# Patient Record
Sex: MALE | Race: WHITE | Employment: FULL TIME | URBAN - METROPOLITAN AREA
[De-identification: names, ages, dates, MRNs, and addresses within clinical notes are randomized per-mention and may not be internally consistent; named-entity substitution may affect disease eponyms.]

---

## 2018-02-05 ENCOUNTER — OFFICE VISIT (OUTPATIENT)
Dept: SLEEP MEDICINE | Age: 33
End: 2018-02-05

## 2018-02-05 ENCOUNTER — HOSPITAL ENCOUNTER (OUTPATIENT)
Dept: OTHER | Age: 33
Discharge: HOME | End: 2018-02-07
Attending: PSYCHIATRY & NEUROLOGY | Admitting: PSYCHIATRY & NEUROLOGY

## 2018-02-05 VITALS
BODY MASS INDEX: 39.11 KG/M2 | TEMPERATURE: 98.4 F | RESPIRATION RATE: 16 BRPM | SYSTOLIC BLOOD PRESSURE: 120 MMHG | HEART RATE: 94 BPM | DIASTOLIC BLOOD PRESSURE: 84 MMHG | WEIGHT: 249.2 LBS | HEIGHT: 67 IN | OXYGEN SATURATION: 94 %

## 2018-02-05 DIAGNOSIS — F51.8 HYPNIC JERKS: ICD-10-CM

## 2018-02-05 DIAGNOSIS — G47.33 OSA (OBSTRUCTIVE SLEEP APNEA): ICD-10-CM

## 2018-02-05 DIAGNOSIS — G47.33 OSA (OBSTRUCTIVE SLEEP APNEA): Primary | ICD-10-CM

## 2018-02-05 PROCEDURE — 99204 OFFICE O/P NEW MOD 45 MIN: CPT | Performed by: PSYCHIATRY & NEUROLOGY

## 2018-02-05 ASSESSMENT — SLEEP AND FATIGUE QUESTIONNAIRES
HOW LIKELY ARE YOU TO NOD OFF OR FALL ASLEEP WHILE SITTING AND READING: 3
HOW LIKELY ARE YOU TO NOD OFF OR FALL ASLEEP WHILE SITTING INACTIVE IN A PUBLIC PLACE: 2
HOW LIKELY ARE YOU TO NOD OFF OR FALL ASLEEP IN A CAR, WHILE STOPPED FOR A FEW MINUTES IN TRAFFIC: 2
HOW LIKELY ARE YOU TO NOD OFF OR FALL ASLEEP WHILE SITTING QUIETLY AFTER LUNCH WITHOUT ALCOHOL: 2
HOW LIKELY ARE YOU TO NOD OFF OR FALL ASLEEP WHILE LYING DOWN TO REST IN THE AFTERNOON WHEN CIRCUMSTANCES PERMIT: 3
HOW LIKELY ARE YOU TO NOD OFF OR FALL ASLEEP WHEN YOU ARE A PASSENGER IN A CAR FOR AN HOUR WITHOUT A BREAK: 2
HOW LIKELY ARE YOU TO NOD OFF OR FALL ASLEEP WHILE WATCHING TV: 2
HOW LIKELY ARE YOU TO NOD OFF OR FALL ASLEEP WHILE SITTING AND TALKING TO SOMEONE: 0
NECK CIRCUMFERENCE (INCHES): 19.25
ESS TOTAL SCORE: 16

## 2018-02-05 ASSESSMENT — ENCOUNTER SYMPTOMS
EYES NEGATIVE: 1
CHOKING: 1
GASTROINTESTINAL NEGATIVE: 1
APNEA: 1

## 2018-02-05 NOTE — PROGRESS NOTES
MD LYN Fish Board Certified in Sleep Medicine  Certified in [address] [place] [address] Certified in Neurology [address] [address] [address],  Mickey [place] 66  326 [facility] [phone]              [facility] 733 [facility] SLEEP MEDICINE [place]    Subjective:     Patient ID: Blayne Marquez is a 34 y.o. male. Chief Complaint   Patient presents with   Viji Henning Other     Dr Salvador Wong, snoring       HPI:        Blayne Marquez is a 34 y.o. male referred by Dr Salvador Wong for a sleep evaluation. He complains of snoring, snorting, choking, periods of not breathing, tossing and turning, kicking, decreased concentration, excessive daytime sleepiness, feels sleepy during the day, take naps during the day but he denies decreased sexual drive, knees buckling with laughing, completely or partially paralyzed while falling asleep or waking up, noisy environment, uncomfortable room temperature, uncomfortable bedding. Symptoms began several years ago, gradually worsening since that time. The patient's bed-partner confirmed the snoring and stopped breathing at night  SLEEP SCHEDULE: Goes to bed around 11 PM-12 AM in the weekdays and 12-1 PM in the weekends. It usually takes the patient 5 minutes to fall asleep. The patient gets up 1-2 per night to go to the bathroom. The Patient finally gets up at 7:30 AM during the weekdays and 8 AM in the weekends. patient wakes up with dry mouth and sometimes morning headache. . the headache usually dull headache lasts 30-60 minutes. The patient has restless sleep with frequent arousals in addition to the Patient has significant daytime sleepiness. The Patient scored Total score: 16 on [place] Sleepiness Scale ( more than 10 is indicative of daytime sleepiness) and 38 in fatigue scale ( more than 36 is indicative of daytime fatigue). The patient takes daily nap for 15-60 minutes and usually is not refreshing nap.  Few episode of hypnic jerks a week.     Previous evaluation and treatment has included- none. DOT/CDL - No  FAA/'s license - No      Previous Report(s) Reviewed: historical medical records         Social History     Social History    Marital status:      Spouse name: N/A    Number of children: N/A    Years of education: N/A     Occupational History    Not on file. Social History Main Topics    Smoking status: Current Every Day Smoker     Packs/day: 1.00     Years: 15.00     Types: E-Cigarettes    Smokeless tobacco: Never Used    Alcohol use No    Drug use: No    Sexual activity: Yes     Other Topics Concern    Not on file     Social History Narrative    No narrative on file       Prior to Admission medications    Not on File       Allergies as of 02/05/2018 - Review Complete 02/05/2018   Allergen Reaction Noted    Penicillins  01/22/2018       There is no problem list on file for this patient. Past Medical History:   Diagnosis Date    Obesity        No past surgical history on file. Family History   Problem Relation Age of Onset    High Blood Pressure Mother     Diabetes Mother        Review of Systems   Constitutional: Positive for fatigue and unexpected weight change (15 pounds weight gain in the last 5 yeras). HENT: Negative for congestion. Eyes: Negative. Respiratory: Positive for apnea and choking. Cardiovascular: Negative. Gastrointestinal: Negative. Endocrine: Negative. Genitourinary: Positive for frequency (nighttime). Musculoskeletal: Negative. Skin: Negative. Neurological: Negative. Negative for headaches. Hematological: Negative. Psychiatric/Behavioral: Positive for agitation, decreased concentration and dysphoric mood. The patient is nervous/anxious. All other systems reviewed and are negative.       Objective:     Vitals:  Weight BMI Neck circumference    Wt Readings from Last 3 Encounters:   02/05/18 249 lb 3.2 oz (113 kg)   01/22/18 248 lb (112.5 kg) the patient should be cautioned to avoid the use of alcohol or other depressant medications because of potential for increasing the duration and severity of apnea and cautioned regarding driving or operating and dangerous equipment if the patient is experiencing daytime sleepiness. .      Orders Placed This Encounter   Procedures    Baseline Diagnostic Sleep Study    Sleep Study with PAP Titration       Return in about 3 months (around 5/5/2018) for to review the PSG and CPAP usage.     Triston Jauregui MD  Medical Director [address] [place] [address]

## 2018-02-06 PROCEDURE — 95811 POLYSOM 6/>YRS CPAP 4/> PARM: CPT | Performed by: PSYCHIATRY & NEUROLOGY

## 2018-02-07 ENCOUNTER — TELEPHONE (OUTPATIENT)
Dept: PULMONOLOGY | Age: 33
End: 2018-02-07

## 2018-04-11 ENCOUNTER — OFFICE VISIT (OUTPATIENT)
Dept: SLEEP MEDICINE | Age: 33
End: 2018-04-11

## 2018-04-11 VITALS
HEART RATE: 86 BPM | OXYGEN SATURATION: 96 % | DIASTOLIC BLOOD PRESSURE: 60 MMHG | WEIGHT: 251 LBS | BODY MASS INDEX: 39.39 KG/M2 | HEIGHT: 67 IN | TEMPERATURE: 99 F | SYSTOLIC BLOOD PRESSURE: 96 MMHG | RESPIRATION RATE: 16 BRPM

## 2018-04-11 DIAGNOSIS — G47.33 OSA ON CPAP: Primary | ICD-10-CM

## 2018-04-11 DIAGNOSIS — Z99.89 OSA ON CPAP: Primary | ICD-10-CM

## 2018-04-11 PROCEDURE — G8417 CALC BMI ABV UP PARAM F/U: HCPCS | Performed by: PSYCHIATRY & NEUROLOGY

## 2018-04-11 PROCEDURE — 99213 OFFICE O/P EST LOW 20 MIN: CPT | Performed by: PSYCHIATRY & NEUROLOGY

## 2018-04-11 PROCEDURE — 4004F PT TOBACCO SCREEN RCVD TLK: CPT | Performed by: PSYCHIATRY & NEUROLOGY

## 2018-04-11 PROCEDURE — G8427 DOCREV CUR MEDS BY ELIG CLIN: HCPCS | Performed by: PSYCHIATRY & NEUROLOGY

## 2018-04-11 ASSESSMENT — ENCOUNTER SYMPTOMS
CHOKING: 0
EYES NEGATIVE: 1
GASTROINTESTINAL NEGATIVE: 1
APNEA: 0
ALLERGIC/IMMUNOLOGIC NEGATIVE: 1
RESPIRATORY NEGATIVE: 1

## 2018-04-11 ASSESSMENT — SLEEP AND FATIGUE QUESTIONNAIRES
HOW LIKELY ARE YOU TO NOD OFF OR FALL ASLEEP WHILE SITTING AND TALKING TO SOMEONE: 0
HOW LIKELY ARE YOU TO NOD OFF OR FALL ASLEEP IN A CAR, WHILE STOPPED FOR A FEW MINUTES IN TRAFFIC: 0
HOW LIKELY ARE YOU TO NOD OFF OR FALL ASLEEP WHEN YOU ARE A PASSENGER IN A CAR FOR AN HOUR WITHOUT A BREAK: 1
ESS TOTAL SCORE: 4
HOW LIKELY ARE YOU TO NOD OFF OR FALL ASLEEP WHILE SITTING AND READING: 0
HOW LIKELY ARE YOU TO NOD OFF OR FALL ASLEEP WHILE SITTING QUIETLY AFTER LUNCH WITHOUT ALCOHOL: 0
HOW LIKELY ARE YOU TO NOD OFF OR FALL ASLEEP WHILE LYING DOWN TO REST IN THE AFTERNOON WHEN CIRCUMSTANCES PERMIT: 2
HOW LIKELY ARE YOU TO NOD OFF OR FALL ASLEEP WHILE SITTING INACTIVE IN A PUBLIC PLACE: 0
HOW LIKELY ARE YOU TO NOD OFF OR FALL ASLEEP WHILE WATCHING TV: 1

## 2018-09-14 DIAGNOSIS — K62.89 RECTAL PAIN: ICD-10-CM

## 2018-09-14 DIAGNOSIS — Z30.09 VASECTOMY EVALUATION: ICD-10-CM

## 2018-09-14 LAB
A/G RATIO: 1.6 (ref 1.1–2.2)
ALBUMIN SERPL-MCNC: 4.4 G/DL (ref 3.4–5)
ALP BLD-CCNC: 82 U/L (ref 40–129)
ALT SERPL-CCNC: 33 U/L (ref 10–40)
ANION GAP SERPL CALCULATED.3IONS-SCNC: 13 MMOL/L (ref 3–16)
AST SERPL-CCNC: 24 U/L (ref 15–37)
BASOPHILS ABSOLUTE: 0 K/UL (ref 0–0.2)
BASOPHILS RELATIVE PERCENT: 0.2 %
BILIRUB SERPL-MCNC: 0.3 MG/DL (ref 0–1)
BUN BLDV-MCNC: 13 MG/DL (ref 7–20)
CALCIUM SERPL-MCNC: 9.2 MG/DL (ref 8.3–10.6)
CHLORIDE BLD-SCNC: 107 MMOL/L (ref 99–110)
CHOLESTEROL, TOTAL: 170 MG/DL (ref 0–199)
CO2: 23 MMOL/L (ref 21–32)
CREAT SERPL-MCNC: 0.9 MG/DL (ref 0.9–1.3)
EOSINOPHILS ABSOLUTE: 0.2 K/UL (ref 0–0.6)
EOSINOPHILS RELATIVE PERCENT: 3.1 %
ESTIMATED AVERAGE GLUCOSE: 102.5 MG/DL
GFR AFRICAN AMERICAN: >60
GFR NON-AFRICAN AMERICAN: >60
GLOBULIN: 2.7 G/DL
GLUCOSE BLD-MCNC: 92 MG/DL (ref 70–99)
HBA1C MFR BLD: 5.2 %
HCT VFR BLD CALC: 46.3 % (ref 40.5–52.5)
HDLC SERPL-MCNC: 35 MG/DL (ref 40–60)
HEMOGLOBIN: 15.6 G/DL (ref 13.5–17.5)
LDL CHOLESTEROL CALCULATED: 105 MG/DL
LYMPHOCYTES ABSOLUTE: 1.9 K/UL (ref 1–5.1)
LYMPHOCYTES RELATIVE PERCENT: 29.3 %
MCH RBC QN AUTO: 29.7 PG (ref 26–34)
MCHC RBC AUTO-ENTMCNC: 33.6 G/DL (ref 31–36)
MCV RBC AUTO: 88.3 FL (ref 80–100)
MONOCYTES ABSOLUTE: 0.6 K/UL (ref 0–1.3)
MONOCYTES RELATIVE PERCENT: 9.3 %
NEUTROPHILS ABSOLUTE: 3.9 K/UL (ref 1.7–7.7)
NEUTROPHILS RELATIVE PERCENT: 58.1 %
PDW BLD-RTO: 13.8 % (ref 12.4–15.4)
PLATELET # BLD: 202 K/UL (ref 135–450)
PMV BLD AUTO: 8.6 FL (ref 5–10.5)
POTASSIUM SERPL-SCNC: 4.4 MMOL/L (ref 3.5–5.1)
RBC # BLD: 5.24 M/UL (ref 4.2–5.9)
SEDIMENTATION RATE, ERYTHROCYTE: 10 MM/HR (ref 0–15)
SODIUM BLD-SCNC: 143 MMOL/L (ref 136–145)
TOTAL PROTEIN: 7.1 G/DL (ref 6.4–8.2)
TRIGL SERPL-MCNC: 151 MG/DL (ref 0–150)
TSH SERPL DL<=0.05 MIU/L-ACNC: 3.02 UIU/ML (ref 0.27–4.2)
VLDLC SERPL CALC-MCNC: 30 MG/DL
WBC # BLD: 6.7 K/UL (ref 4–11)

## 2018-10-01 ENCOUNTER — INITIAL CONSULT (OUTPATIENT)
Dept: SURGERY | Age: 33
End: 2018-10-01
Payer: COMMERCIAL

## 2018-10-01 VITALS
WEIGHT: 240 LBS | BODY MASS INDEX: 37.59 KG/M2 | SYSTOLIC BLOOD PRESSURE: 134 MMHG | DIASTOLIC BLOOD PRESSURE: 87 MMHG | HEART RATE: 88 BPM

## 2018-10-01 DIAGNOSIS — K59.00 CONSTIPATION, UNSPECIFIED CONSTIPATION TYPE: ICD-10-CM

## 2018-10-01 DIAGNOSIS — K62.5 RECTAL BLEEDING: Primary | ICD-10-CM

## 2018-10-01 PROCEDURE — G8484 FLU IMMUNIZE NO ADMIN: HCPCS | Performed by: SURGERY

## 2018-10-01 PROCEDURE — 99243 OFF/OP CNSLTJ NEW/EST LOW 30: CPT | Performed by: SURGERY

## 2018-10-01 PROCEDURE — G8427 DOCREV CUR MEDS BY ELIG CLIN: HCPCS | Performed by: SURGERY

## 2018-10-01 PROCEDURE — G8417 CALC BMI ABV UP PARAM F/U: HCPCS | Performed by: SURGERY

## 2018-10-01 NOTE — PROGRESS NOTES
New Patient [facility]   Gopi Womack MD    Tiffany Roque, [address] [place] [address] Drive  Siobhan Shore  Phone: [phone]  Fax: [phone]    Buffy Hoffman   YOB: 1985    Date of Visit:  10/1/2018    Josefa Williamson MD    HPI:     Hemorrhoid:  Buffy Hoffman is a 28 y.o. male seen at request of Nga Mckeon MD.   Patient presents for evaluation of hemorrhoids. Problems were first noted a few years ago. He states that he had intermittent hard stools. His BMs are daily. He has had pain with bowel movements in the past but not recently. He has has some blood with bowel movement, but denies any seepage of stool. No blood thinners, no prior abdominal surgeries. Pain Score:   0 - No pain    Allergies   Allergen Reactions    Penicillins      Outpatient Prescriptions Marked as Taking for the 10/1/18 encounter (Initial consult) with Valentina Salinas MD   Medication Sig Dispense Refill    psyllium (METAMUCIL SMOOTH TEXTURE) 28 % packet Take 1 packet by mouth 2 times daily Take with full glass of h20 or juice 60 packet 4    polyethylene glycol (MIRALAX) powder Take 17 g by mouth daily 510 g 2    hydrocortisone (ANUSOL-HC) 25 MG suppository Place 1 suppository rectally every 12 hours 60 suppository 2       Past Medical History:   Diagnosis Date    Obesity      History reviewed. No pertinent surgical history. Family History   Problem Relation Age of Onset    High Blood Pressure Mother     Diabetes Mother      Social History     Social History    Marital status:      Spouse name: N/A    Number of children: N/A    Years of education: N/A     Occupational History    Not on file.      Social History Main Topics    Smoking status: Current Every Day Smoker     Packs/day: 1.00     Years: 15.00     Types: E-Cigarettes    Smokeless tobacco: Never Used    Alcohol use No    Drug use: No    Sexual

## 2020-10-05 ENCOUNTER — OFFICE VISIT (OUTPATIENT)
Dept: SLEEP MEDICINE | Age: 35
End: 2020-10-05
Payer: COMMERCIAL

## 2020-10-05 VITALS
DIASTOLIC BLOOD PRESSURE: 76 MMHG | HEIGHT: 67 IN | HEART RATE: 69 BPM | TEMPERATURE: 98.6 F | WEIGHT: 209 LBS | RESPIRATION RATE: 16 BRPM | SYSTOLIC BLOOD PRESSURE: 124 MMHG | BODY MASS INDEX: 32.8 KG/M2 | OXYGEN SATURATION: 95 %

## 2020-10-05 PROCEDURE — G8417 CALC BMI ABV UP PARAM F/U: HCPCS | Performed by: PSYCHIATRY & NEUROLOGY

## 2020-10-05 PROCEDURE — 4004F PT TOBACCO SCREEN RCVD TLK: CPT | Performed by: PSYCHIATRY & NEUROLOGY

## 2020-10-05 PROCEDURE — G8484 FLU IMMUNIZE NO ADMIN: HCPCS | Performed by: PSYCHIATRY & NEUROLOGY

## 2020-10-05 PROCEDURE — G8427 DOCREV CUR MEDS BY ELIG CLIN: HCPCS | Performed by: PSYCHIATRY & NEUROLOGY

## 2020-10-05 PROCEDURE — 99213 OFFICE O/P EST LOW 20 MIN: CPT | Performed by: PSYCHIATRY & NEUROLOGY

## 2020-10-05 ASSESSMENT — SLEEP AND FATIGUE QUESTIONNAIRES
HOW LIKELY ARE YOU TO NOD OFF OR FALL ASLEEP WHILE LYING DOWN TO REST IN THE AFTERNOON WHEN CIRCUMSTANCES PERMIT: 2
HOW LIKELY ARE YOU TO NOD OFF OR FALL ASLEEP WHILE SITTING INACTIVE IN A PUBLIC PLACE: 0
HOW LIKELY ARE YOU TO NOD OFF OR FALL ASLEEP IN A CAR, WHILE STOPPED FOR A FEW MINUTES IN TRAFFIC: 0
HOW LIKELY ARE YOU TO NOD OFF OR FALL ASLEEP WHILE WATCHING TV: 1
HOW LIKELY ARE YOU TO NOD OFF OR FALL ASLEEP WHILE SITTING AND TALKING TO SOMEONE: 0
ESS TOTAL SCORE: 4
HOW LIKELY ARE YOU TO NOD OFF OR FALL ASLEEP WHILE SITTING AND READING: 1
HOW LIKELY ARE YOU TO NOD OFF OR FALL ASLEEP WHEN YOU ARE A PASSENGER IN A CAR FOR AN HOUR WITHOUT A BREAK: 0
HOW LIKELY ARE YOU TO NOD OFF OR FALL ASLEEP WHILE SITTING QUIETLY AFTER LUNCH WITHOUT ALCOHOL: 0

## 2020-10-05 ASSESSMENT — ENCOUNTER SYMPTOMS
CHOKING: 0
APNEA: 0

## 2020-10-05 NOTE — PATIENT INSTRUCTIONS
Patient Education        Learning About CPAP for Sleep Apnea  What is CPAP? CPAP is a small machine that you use at home every night while you sleep. It increases air pressure in your throat to keep your airway open. When you have sleep apnea, this can help you sleep better so you feel much better. CPAP stands for \"continuous positive airway pressure. \"  The CPAP machine will have one of the following:  · A mask that covers your nose and mouth  · Prongs that fit into your nose  · A mask that covers your nose only, the most common type. This type is called NCPAP. The N stands for \"nasal.\"  Why is it done? CPAP is usually the best treatment for obstructive sleep apnea. It is the first treatment choice and the most widely used. Your doctor may suggest CPAP if you have:  · Moderate to severe sleep apnea. · Sleep apnea and coronary artery disease (CAD). · Sleep apnea and heart failure. How does it help? · CPAP can help you have more normal sleep, so you feel less sleepy and more alert during the daytime. · CPAP may help keep heart failure or other heart problems from getting worse. · CPAP may help lower your blood pressure. · If you use CPAP, your bed partner may also sleep better because you are not snoring or restless. What are the side effects? Some people who use CPAP have:  · A dry or stuffy nose and a sore throat. · Irritated skin on the face. · Sore eyes. · Bloating. If you have any of these problems, work with your doctor to fix them. Here are some things you can try:  · Be sure the mask or nasal prongs fit well. · See if your doctor can adjust the pressure of your CPAP. · If your nose is dry, try a humidifier. · If your nose is runny or stuffy, try decongestant medicine or a steroid nasal spray. Be safe with medicines. Read and follow all instructions on the label. Do not use the medicine longer than the label says. If these things do not help, you might try a different type of machine. Some machines have air pressure that adjusts on its own. Others have air pressures that are different when you breathe in than when you breathe out. This may reduce discomfort caused by too much pressure in your nose. Where can you learn more? Go to https://chnusrat.[employer]. org and sign in to your [employer] account. Enter W015 in the Netccm box to learn more about \"Learning About CPAP for Sleep Apnea. \"     If you do not have an account, please click on the \"Sign Up Now\" link. Current as of: February 24, 2020               Content Version: 12.5  © 7253-2942 Healthwise, Incorporated. Care instructions adapted under license by [facility] ([facility]). If you have questions about a medical condition or this instruction, always ask your healthcare professional. [address] any warranty or liability for your use of this information.

## 2020-10-05 NOTE — PROGRESS NOTES
1.00     Years: 15.00     Pack years: 15.00     Types: E-Cigarettes    Smokeless tobacco: Never Used   Substance and Sexual Activity    Alcohol use: No    Drug use: No    Sexual activity: Yes   Lifestyle    Physical activity     Days per week: Not on file     Minutes per session: Not on file    Stress: Not on file   Relationships    Social connections     Talks on phone: Not on file     Gets together: Not on file     Attends Episcopalian service: Not on file     Active member of club or organization: Not on file     Attends meetings of clubs or organizations: Not on file     Relationship status: Not on file    Intimate partner violence     Fear of current or ex partner: Not on file     Emotionally abused: Not on file     Physically abused: Not on file     Forced sexual activity: Not on file   Other Topics Concern    Not on file   Social History Narrative    Not on file       Prior to Admission medications    Medication Sig Start Date End Date Taking? Authorizing Provider   loratadine-pseudoephedrine (CLARITIN-D 24 HOUR)  MG per extended release tablet Take 1 tablet by mouth daily 6/12/19  Yes Danish Baca MD   cetirizine (ZYRTEC ALLERGY) 10 MG tablet Take 1 tablet by mouth daily 9/20/18  Yes Danish Baca MD   hydrocortisone (ANUSOL-HC) 25 MG suppository Place 1 suppository rectally every 12 hours  Patient not taking: Reported on 10/5/2020 9/12/18   Danish Baca MD       Allergies as of 10/05/2020 - Review Complete 10/05/2020   Allergen Reaction Noted    Penicillins  01/22/2018       Patient Active Problem List   Diagnosis    DAQUAN (obstructive sleep apnea)       Past Medical History:   Diagnosis Date    Obesity        History reviewed. No pertinent surgical history. Family History   Problem Relation Age of Onset    High Blood Pressure Mother     Diabetes Mother        Review of Systems   Constitutional: Negative for fatigue. Respiratory: Negative for apnea and choking. Cardiovascular: Negative for leg swelling. Genitourinary: Negative for frequency. Neurological: Negative for headaches. Objective:     Vitals:  Weight BMI Neck circumference    Wt Readings from Last 3 Encounters:   10/05/20 209 lb (94.8 kg)   06/12/19 230 lb (104.3 kg)   10/01/18 240 lb (108.9 kg)    Body mass index is 32.73 kg/m². BP HR SaO2   BP Readings from Last 3 Encounters:   10/05/20 124/76   06/12/19 120/72   10/01/18 134/87    Pulse Readings from Last 3 Encounters:   10/05/20 69   06/12/19 78   10/01/18 88    SpO2 Readings from Last 3 Encounters:   10/05/20 95%   06/12/19 96%   09/20/18 94%        Themandibular molar Class :   [x]1 []2 []3      Mallampati I Airway Classification:   []1 []2 [x]3 []4      Physical Exam  Vitals signs and nursing note reviewed. Constitutional:       Appearance: Normal appearance. HENT:      Head: Atraumatic. Nose: Nose normal.      Mouth/Throat:      Mouth: Mucous membranes are moist.   Eyes:      Extraocular Movements: Extraocular movements intact. Neck:      Musculoskeletal: Normal range of motion and neck supple. Cardiovascular:      Rate and Rhythm: Normal rate and regular rhythm. Heart sounds: Normal heart sounds. Pulmonary:      Effort: Pulmonary effort is normal.      Breath sounds: Normal breath sounds. Musculoskeletal: Normal range of motion. Skin:     General: Skin is warm. Neurological:      General: No focal deficit present. Psychiatric:         Mood and Affect: Mood normal.         :   Severe Obstructive Sleep Apnea/Hypopnea Syndrome under good control on PAP at 10 cmwp. Diagnosis Orders   1. DAQUAN on CPAP     2. Dependence on other enabling machines and devices       Plan: Will continue the PAP at 10 cmwp. I will order PAP supplies, mask, filters. ... No orders of the defined types were placed in this encounter.       Return in about 1 year (around 10/5/2021) for Reveiwing CPAP usage and compliance report and lisa Pacheco MD  Medical Director - [facility]

## 2021-01-13 ENCOUNTER — OFFICE VISIT (OUTPATIENT)
Dept: ENT CLINIC | Age: 36
End: 2021-01-13
Payer: COMMERCIAL

## 2021-01-13 VITALS
BODY MASS INDEX: 32.33 KG/M2 | SYSTOLIC BLOOD PRESSURE: 143 MMHG | HEART RATE: 75 BPM | TEMPERATURE: 97.7 F | HEIGHT: 67 IN | WEIGHT: 206 LBS | DIASTOLIC BLOOD PRESSURE: 80 MMHG

## 2021-01-13 DIAGNOSIS — R49.0 DYSPHONIA: Primary | ICD-10-CM

## 2021-01-13 DIAGNOSIS — K21.9 LARYNGOPHARYNGEAL REFLUX (LPR): ICD-10-CM

## 2021-01-13 DIAGNOSIS — F17.200 SMOKING ADDICTION: ICD-10-CM

## 2021-01-13 PROCEDURE — 99204 OFFICE O/P NEW MOD 45 MIN: CPT | Performed by: OTOLARYNGOLOGY

## 2021-01-13 PROCEDURE — G8484 FLU IMMUNIZE NO ADMIN: HCPCS | Performed by: OTOLARYNGOLOGY

## 2021-01-13 PROCEDURE — G8417 CALC BMI ABV UP PARAM F/U: HCPCS | Performed by: OTOLARYNGOLOGY

## 2021-01-13 PROCEDURE — 4004F PT TOBACCO SCREEN RCVD TLK: CPT | Performed by: OTOLARYNGOLOGY

## 2021-01-13 PROCEDURE — G8427 DOCREV CUR MEDS BY ELIG CLIN: HCPCS | Performed by: OTOLARYNGOLOGY

## 2021-01-13 PROCEDURE — 31575 DIAGNOSTIC LARYNGOSCOPY: CPT | Performed by: OTOLARYNGOLOGY

## 2021-01-13 RX ORDER — OMEPRAZOLE 20 MG/1
20 CAPSULE, DELAYED RELEASE ORAL
Qty: 30 CAPSULE | Refills: 5 | Status: SHIPPED | OUTPATIENT
Start: 2021-01-13

## 2021-01-13 NOTE — PROGRESS NOTES
[employer]      Patient Name: Burak Raya  Medical Record Number:  <[id number]  Primary Care Physician:  Talisha Beyer MD, MD  Date of Consultation: 1/13/2021    Chief Complaint: Dysphonia        HISTORY OF PRESENT ILLNESS  Debi Churchill is a(n) 38 y.o. male who presents for evaluation of voice issues. 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  He has been noting a little more difficulty with his voice recently. He said oftentimes will be later in the day. He does notices it will take more effort to same. He also notes a little bit of discomfort when standing at times. Afterwards he sometimes notices his normal voice is little different. He does admit to being a smoker. He used to have a really bad reflux, but since losing weight and started on CPAP this has not been as much of an issue. He denies concerning symptoms such as coughing up blood, weight loss that was unintentional or neck masses. No family or personal history of head neck cancer. Patient Active Problem List   Diagnosis    DAQUAN (obstructive sleep apnea)     No past surgical history on file.   Family History   Problem Relation Age of Onset    High Blood Pressure Mother     Diabetes Mother      Social History     Socioeconomic History    Marital status:      Spouse name: Not on file    Number of children: Not on file    Years of education: Not on file    Highest education level: Not on file   Occupational History    Not on file   Social Needs    Financial resource strain: Not on file    Food insecurity     Worry: Not on file     Inability: Not on file    Transportation needs     Medical: Not on file     Non-medical: Not on file   Tobacco Use    Smoking status: Current Every Day Smoker     Packs/day: 1.00     Years: 15.00     Pack years: 15.00     Types: E-Cigarettes    Smokeless tobacco: Never Used   Substance and Sexual Activity  Alcohol use: No    Drug use: No    Sexual activity: Yes   Lifestyle    Physical activity     Days per week: Not on file     Minutes per session: Not on file    Stress: Not on file   Relationships    Social connections     Talks on phone: Not on file     Gets together: Not on file     Attends Scientologist service: Not on file     Active member of club or organization: Not on file     Attends meetings of clubs or organizations: Not on file     Relationship status: Not on file    Intimate partner violence     Fear of current or ex partner: Not on file     Emotionally abused: Not on file     Physically abused: Not on file     Forced sexual activity: Not on file   Other Topics Concern    Not on file   Social History Narrative    Not on file       DRUG/FOOD ALLERGIES: Penicillins    CURRENT MEDICATIONS  Prior to Admission medications    Medication Sig Start Date End Date Taking?  Authorizing Provider   loratadine-pseudoephedrine (CLARITIN-D 24 HOUR)  MG per extended release tablet Take 1 tablet by mouth daily 6/12/19   Steve Rosen MD   cetirizine (ZYRTEC ALLERGY) 10 MG tablet Take 1 tablet by mouth daily 9/20/18   Steve Rosen MD   hydrocortisone (ANUSOL-HC) 25 MG suppository Place 1 suppository rectally every 12 hours  Patient not taking: Reported on 10/5/2020 9/12/18   Steve Rosen MD       REVIEW OF SYSTEMS  The following systems were reviewed and revealed the following in addition to any already discussed in the HPI:    CONSTITUTIONAL: no weight loss, no fever, no night sweats, no chills  EYES: no vision changes, no blurry vision  EARS: no changes in hearing, no otalgia  NOSE: no epistaxis, no rhinorrhea  RESPIRATORY: no  Difficulty breathing, no shortness of breath  CV: no chest pain, no Peripheral vascular disease  HEME: No coagulation disorder, no Bleeding disorder  NEURO: no TIA or stroke-like symptoms  SKIN: No new rashes in the head and neck, no recent skin cancers MOUTH: No new ulcers, no recent teeth infections  GASTROINTESTINAL: No diarrhea, stomach pain  PSYCH: No anxiety, no depression      PHYSICAL EXAM  BP (!) 143/80 (Site: Left Upper Arm, Position: Sitting, Cuff Size: Large Adult)   Pulse 75   Temp 97.7 °F (36.5 °C) (Temporal)   Ht 5' 7\" (1.702 m)   Wt 206 lb (93.4 kg)   BMI 32.26 kg/m²     GENERAL: No Acute Distress, Alert and Oriented, no Hoarseness, strong voice  EYES: EOMI, Anti-icteric  HENT:   Head: Normocephalic and atraumatic. Face:  Symmetric, facial nerve intact, no sinus tenderness  Right Ear: Normal external ear, normal external auditory canal, intact tympanic membrane with normal mobility and aerated middle ear  Left Ear: Normal external ear, normal external auditory canal, intact tympanic membrane with normal mobility and aerated middle ear  Mouth/Oral Cavity:  normal lips, Uvula is midline, no mucosal lesions, no trismus, normal dentition, normal salivary quality/flow  Oropharynx/Larynx:  normal oropharynx, normal tonsils; see below  Nose:Normal external nasal appearance. Anterior rhinoscopy shows a normal septum. Normal turbinates.   Normal mucosa   NECK: Normal range of motion, no thyromegaly, trachea is midline, no lymphadenopathy, no neck masses, no crepitus  CHEST: Normal respiratory effort, no retractions, breathing comfortably  SKIN: No rashes, normal appearing skin, no evidence of skin lesions/tumors  Neuro:  cranial nerve II-XII intact; normal gait  Cardio:  no edema        PROCEDURE  Flexible laryngoscopy After the lidocaine were applied the bilateral nasal cavity. Flexible scope was passed to left nasal cavity. Nasopharynx was normal.  Base of tongue and vallecula were normal.  Patient had quite a bit of interarytenoid edema. He also has some edema of the bilateral vocal cords. Both vocal cords were mobile and there was minimal glottic gap, but the right side seem to be chronically hypomobile compared to the left. Otherwise no masses or lesions. ASSESSMENT/PLAN  1. Dysphonia  Multifactorial.  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 This could be secondary to vocal trauma, reflux and smoking. He is going to make a concerted effort to stop smoking. Also going to start him on reflux medication. Given that this is his profession, I have recommended he see our speech therapist for stroboscopy. He had more ability of the left vocal cord than the right, that this is likely just his normal.  However stroboscopy would  something like a sulcus vocalis or small vocal cord nodule that  would be somewhat difficult to see on the endoscopy I did today. I would like to see him back after he sees speech therapy. - [address]    2. Laryngopharyngeal reflux (LPR)  As above  - [address], [place]    3. Smoking addiction  Starting him patient says that he is making a concerted effort to stop at this time. He has stopped in the past and his company can do it again.  - [place]             I have performed a head and neck physical exam personally or was physically present during the key or critical portions of the service. Medical Decision Making:   The following items were considered in medical decision making:  Independent review of images  Review / order clinical lab tests  Review / order radiology tests  Decision to obtain old records

## 2021-01-14 ENCOUNTER — PROCEDURE VISIT (OUTPATIENT)
Dept: SPEECH THERAPY | Age: 36
End: 2021-01-14
Payer: COMMERCIAL

## 2021-01-14 DIAGNOSIS — R49.0 DYSPHONIA: ICD-10-CM

## 2021-01-14 PROCEDURE — 92507 TX SP LANG VOICE COMM INDIV: CPT | Performed by: SPEECH-LANGUAGE PATHOLOGIST

## 2021-01-14 PROCEDURE — 31579 LARYNGOSCOPY TELESCOPIC: CPT | Performed by: SPEECH-LANGUAGE PATHOLOGIST

## 2021-01-14 NOTE — PROGRESS NOTES
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 Reported acute changes specifically w/ singing voice, resulting in sensation of strain and excess roughness. Pitch breaks and decreased sensation of \"control\". Now beginning to impact speaking voice, specifically ongoing sensation of strain. Reported having to cancel multiple sessions this past week. Does have hx of reflux, but improved after beginning use of CPAP and weight loss. However, does experience globus sensation and increased mucus w/ throat clearing. ENT provided w/ reflux medication but has not yet picked up from pharmacy. Reported intermittent dysphagia characterized by sensation of \"food slowly clearing down my esophagus\". No other difficulties. Denied dyspnea. Pt does have hx of smoking but currently is using vape pen to assist.    Surgical/Medical History: See the above. Hydration: >64 oz (approx 100 oz)  Smoking History: Cigarettes; using vape pen to assist w/ quitting  Caffeine Intake: 16 oz of coffee    PER ENT NOTE, Dr. Yenni Heart, 1/13/21:  Multifactorial.  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 This could be secondary to vocal trauma, reflux and smoking. He is going to make a concerted effort to stop smoking. Also going to start him on reflux medication. Given that this is his profession, I have recommended he see our speech therapist for stroboscopy. He had more ability of the left vocal cord than the right, that this is likely just his normal.  However stroboscopy would  something like a sulcus vocalis or small vocal cord nodule that  would be somewhat difficult to see on the endoscopy I did today. I would like to see him back after he sees speech therapy.     Perceptual Quality: Pt presented with mild dysphonia characterized by back-focused phonation and prescribed reflux medication. Reviewed proper vocal hygiene, including increased water intake and vocal naps between sessions to allow for breaks. Encouraged pt to begin using \"o\" buzz during warm-ups and repertoire w/ students rather than full-voice singing for both proper technique and decreased strain on voice. Completed SOVT strawflow w/ minimal difficulties related to coordination of respiration and phonation; required min cues for light voicing. Noted to have mild upper cervical tension and discussed proper posture during teaching. RECOMMENDATIONS:   1. Dr. Hattie Carcamo reviewed recorded evaluation to assist in diagnosis and provide assessment and plan for treatment. 2. Follow good vocal hygiene behaviors and precautions, including increasing oral hydration. 3. Follow dietary precautions and behavioral lifestyle changes regarding laryngopharyngeal reflux, including taking PPI as prescribed by physician. 4. Voice therapy to focus on re-strengthening and balancing the laryngeal musculature, to promote to open oral front focus, to promote using adequate diaphragmatic breath support to sustain conversational speech. 5. Pt is a good candidate for further medical evaluation/intervention at the discretion of the treating physician. 6. Pt to follow up with ENT/Dr. Hattie Carcamo.     Thank you,    Sid Davis [place], [place]; [id number]  Speech-Language Pathologist

## 2021-01-27 ENCOUNTER — PROCEDURE VISIT (OUTPATIENT)
Dept: SPEECH THERAPY | Age: 36
End: 2021-01-27
Payer: COMMERCIAL

## 2021-01-27 DIAGNOSIS — R49.0 DYSPHONIA: ICD-10-CM

## 2021-01-27 PROCEDURE — 92507 TX SP LANG VOICE COMM INDIV: CPT | Performed by: SPEECH-LANGUAGE PATHOLOGIST

## 2021-01-27 NOTE — PROGRESS NOTES
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 Reported acute changes specifically w/ singing voice, resulting in sensation of strain and excess roughness. Pitch breaks and decreased sensation of \"control\". Now beginning to impact speaking voice, specifically ongoing sensation of strain. Reported having to cancel multiple sessions this past week. Does have hx of reflux, but improved after beginning use of CPAP and weight loss. However, does experience globus sensation and increased mucus w/ throat clearing. ENT provided w/ reflux medication but has not yet picked up from pharmacy. Reported intermittent dysphagia characterized by sensation of \"food slowly clearing down my esophagus\". No other difficulties. Denied dyspnea. Pt does have hx of smoking but currently is using vape pen to assist.    Previous SLP Evaluations: 1/14/21  VLS revealed mild edematous changes of medial edge TVFs bilaterally; no observed lesions or other etiologies. RVFMA, specifically during abduction. Glottic closure characterized as complete during high pitch but unable to fully visualize glottic closure during modal pitch d/t significant supraglottic compression (AP>LM). Observed functional mucosal wave of posterior 1/3rd bilaterally but unable to assess periodicity. SUBJECTIVE:   Pt endorsed positive improvement in symptoms since previous session; decreased vocal fatigue and able to complete lessons. Taking vocal naps as needed. Using tension releasing exercises in-between patients but is more aware of tension onset. Requested further tension releasing exercises, including vocal warm-ups.     OBJECTIVE:   Voice Therapy    Goal: Session 2 Session 3 Session 4 Pt will adhere to vocal hygiene protocol during daily life activities w/ 80% acc Pt adhered to vocal hygiene protocol w/ 70% acc    Endorsed taking vocal naps as needed and completing exercises daily. Does feel significant decrease in tension and fatigue. Motivated to continue. Pt will adhere to reflux management protocol during daily life activities w/ 80% acc Pt adhered to reflux management protocol w/ 65% acc    Endorsed decreasing caffeine and increasing water intake. Improved globus sensation and decreased mucus. Continues to monitor dietary intake. Pt will complete cervical stretching during daily life activities w/ 80% acc Pt completed cervical stretching w/ 70% acc    Endorsed understanding of postural changes, including grounding of the feet and maintaining shoulder-back, neutral head position. Completed facial stretching for release of lingual and palatal tension including yawning and placing pen under tongue during vocal warm-ups. Pt endorsed sensation of lowering larynx to maintain \"open\" laryngeal position. Pt will express understanding of the subsystems required for speech w/ 80% acc Pt endorsed observation of increased tension when not \"supporting\" w/ airflow; reviewed diaphragmatic breath support and importance for both speaking/singing to promote optimal use of subsystems. Pt will perform SOVT techniques during daily life activities w/ 80% acc Pt performed SOVT techniques via    Strawflow w/ sustained pitch, ascending/descending glides, and sirens w/ 75% acc; required mod cues for gentle voicing and decreased \"pushing\" at the level of larynx. Completed \"o\" buzz w/ sustained pitch w/o difficulties; adequate coordination of respiration and phonation and good forward resonance.        Pt will perform RVT techniques during various voicing tasks w/ 80% acc Pt performed RVT techniques via Humming in isolation w/ 65% acc; initial difficulties w/ feeling forward sensation, but improved w/ cue for light & easy, along w/ space in the mouth and nasal puff of air. Pt endorsed open sensation and no musculature interaction. Mild difficulties w/ \"locking\" into placement consistently, but independently corrected. Humming + words w/ 70% acc; easily maintained forward placement however, reported sensation of \"dropping\" at the end of words. Cue for \"cushioning sound w/ air\" extremely beneficial to pt and able to complete w/o sensation of strain. ASSESSMENT:      38 y.o. male w/ hx of vocal fatigue and strain. Pt feels positive improvement in voice, including decreased fatigue and more awareness of strain/when tension occurs. Reviewed vocal warm-ups and completed cervical stretching; noted to have lingual tension, so implemented palatal/lingual stretching for relaxation of entire system. Discussed subsystems required for speech/singing, specifically breath support; pt endorsed increased tension when not supporting w/ airflow. Reviewed SOVT strawflow and \"O\" buzz, and pt w/o difficulties coordination respiration and phonation along w/ forward focused placement. Introduced to RVT humming and initial difficulties obtaining forward placement; improved w/ multimodal cues and achieved excellent resonance. Able to self-correct when placement inadequate. Transitioned into humming + words and reported sensation of strain at end of words; cue for \"cushioning sound w/ air\" beneficial and pt endorsed open sensation w/o strain. Overall, pt is progressing towards goals and prognosis is excellent w/ ongoing adherence to VOT. RECOMMENDATIONS:      1.  Follow HEP and RTC for ongoing VOT      Thank you,    Shirley Donaldson, [facility], [id number] [facility]; [id number]  Speech-Language Pathologist

## 2021-02-10 ENCOUNTER — PROCEDURE VISIT (OUTPATIENT)
Dept: SPEECH THERAPY | Age: 36
End: 2021-02-10
Payer: COMMERCIAL

## 2021-02-10 DIAGNOSIS — R49.0 DYSPHONIA: ICD-10-CM

## 2021-02-10 PROCEDURE — 92507 TX SP LANG VOICE COMM INDIV: CPT | Performed by: SPEECH-LANGUAGE PATHOLOGIST

## 2021-02-10 NOTE — PROGRESS NOTES
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 Pitch breaks and decreased sensation of \"control\". Now beginning to impact speaking voice, specifically ongoing sensation of strain. Reported having to cancel multiple sessions this past week. Does have hx of reflux, but improved after beginning use of CPAP and weight loss. However, does experience globus sensation and increased mucus w/ throat clearing. ENT provided w/ reflux medication but has not yet picked up from pharmacy. Reported intermittent dysphagia characterized by sensation of \"food slowly clearing down my esophagus\". No other difficulties. Denied dyspnea. Pt does have hx of smoking but currently is using vape pen to assist.    Previous SLP Evaluations: 1/14/21  VLS revealed mild edematous changes of medial edge TVFs bilaterally; no observed lesions or other etiologies. RVFMA, specifically during abduction. Glottic closure characterized as complete during high pitch but unable to fully visualize glottic closure during modal pitch d/t significant supraglottic compression (AP>LM). Observed functional mucosal wave of posterior 1/3rd bilaterally but unable to assess periodicity. SUBJECTIVE:   Ongoing improvements in sensation at this time; decreased strain and feeling of fatigue, specifically when singing. Pt notes voice is back-focused during all conversation, and feels this is the largest contributor to ongoing fatigue.     OBJECTIVE:   Voice Therapy    Goal: Session 2 Session 3 Session 4   Pt will adhere to vocal hygiene protocol during daily life activities w/ 80% acc Pt adhered to vocal hygiene protocol w/ 70% acc    Endorsed taking vocal naps as needed and completing exercises daily. Does feel significant decrease in tension and fatigue. Motivated to continue. Pt adhered to vocal hygiene protocol w/ 75% acc    Pt reported ongoing use of exercises to assist w/ both speaking/singing voice. Feels ongoing improvement. Pt will adhere to reflux management protocol during daily life activities w/ 80% acc Pt adhered to reflux management protocol w/ 65% acc    Endorsed decreasing caffeine and increasing water intake. Improved globus sensation and decreased mucus. Continues to monitor dietary intake. Goal not targeted this session. Pt will complete cervical stretching during daily life activities w/ 80% acc Pt completed cervical stretching w/ 70% acc    Endorsed understanding of postural changes, including grounding of the feet and maintaining shoulder-back, neutral head position. Completed facial stretching for release of lingual and palatal tension including yawning and placing pen under tongue during vocal warm-ups. Pt endorsed sensation of lowering larynx to maintain \"open\" laryngeal position. Goal not targeted this session. Pt will express understanding of the subsystems required for speech w/ 80% acc Pt endorsed observation of increased tension when not \"supporting\" w/ airflow; reviewed diaphragmatic breath support and importance for both speaking/singing to promote optimal use of subsystems. Pt endorsed observation of decreased breath support, specifically during conversational speech. Has become more aware of taking large breaths and supporting during singing/voicing. Pt will perform SOVT techniques during daily life activities w/ 80% acc Pt performed SOVT techniques via    Strawflow w/ sustained pitch, ascending/descending glides, and sirens w/ 75% acc; required mod cues for gentle voicing and decreased \"pushing\" at the level of larynx.      Completed \"o\" buzz w/ sustained pitch w/o difficulties; adequate coordination of respiration and phonation and good forward resonance. Pt performed SOVT techniques via    Strawflow w/ sustained pitch, ascending/descending glides and sirens w/ 75% acc; no difficulties w/ coordination of respiration/phonation but endorsed sensation of strain upon initial trials. Relaxed posture greatly assisted. Pt will perform RVT techniques during various voicing tasks w/ 80% acc Pt performed RVT techniques via    Humming in isolation w/ 65% acc; initial difficulties w/ feeling forward sensation, but improved w/ cue for light & easy, along w/ space in the mouth and nasal puff of air. Pt endorsed open sensation and no musculature interaction. Mild difficulties w/ \"locking\" into placement consistently, but independently corrected. Humming + words w/ 70% acc; easily maintained forward placement however, reported sensation of \"dropping\" at the end of words. Cue for \"cushioning sound w/ air\" extremely beneficial to pt and able to complete w/o sensation of strain. Pt performed RVT techniques via    Humming in isolation w/ 75% acc; noted to have excellent forward resonance w/ elevated pitch. Pt endorsed higher speaking pitch at baseline however, feels he may have \"trained\" himself to speak lower resulting in gravel. Humming + sentences (functional phrases) w/ 70% acc; noted to have intermittent back-focused phonation at end of sentences; independently aware and began to self-correct. Extremely aware of sensation and anatomical placement. Humming + conversation w/ 65% acc; noted to easily become back focused, specifically at end of sentences. Decreased breath support and required cues for diaphragmatic breathing. Greatly improved w/ cues. Pt endorsed increased cognition required for completion.   +        ASSESSMENT:      38 y.o. male w/ hx of vocal fatigue and strain.  Ongoing progress towards goals and independently reported that speaking voice contributing to fatigue; feels voice is back-focused and more gravel as compared to when completing RVT. Reviewed SOVT as warm-up and noted to have excellent coordination of respiration and phonation. Transitioned through RVT steps, including functional phrases and conversational speech. Pt required mod cues for increased breath support and maintaining forward placement, specifically at end of sentences. Noted to have run-on sentences w/o awareness. However, pt is extremely vocally aware and began to correct errors, fading cues to min-no. Encouraged pt to begin implementing conversational speech into all daily speech, and pt felt comfortable w/ completion. Overall, pt is making excellent progress towards goals and prognosis is good w/ ongoing adherence to VOT. RECOMMENDATIONS:      1.  Follow HEP and RTC for ongoing VOT      Thank you,    Sid Davis [place], [place], Sarah Schumacher; [phone]  Speech-Language Pathologist

## 2021-03-10 ENCOUNTER — PROCEDURE VISIT (OUTPATIENT)
Dept: SPEECH THERAPY | Age: 36
End: 2021-03-10
Payer: COMMERCIAL

## 2021-03-10 DIAGNOSIS — R49.0 DYSPHONIA: ICD-10-CM

## 2021-03-10 PROCEDURE — 92507 TX SP LANG VOICE COMM INDIV: CPT | Performed by: SPEECH-LANGUAGE PATHOLOGIST

## 2021-03-10 NOTE — PROGRESS NOTES
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 Pitch breaks and decreased sensation of \"control\". Now beginning to impact speaking voice, specifically ongoing sensation of strain. Reported having to cancel multiple sessions this past week. Does have hx of reflux, but improved after beginning use of CPAP and weight loss. However, does experience globus sensation and increased mucus w/ throat clearing. ENT provided w/ reflux medication but has not yet picked up from pharmacy. Reported intermittent dysphagia characterized by sensation of \"food slowly clearing down my esophagus\". No other difficulties. Denied dyspnea. Pt does have hx of smoking but currently is using vape pen to assist.    Previous SLP Evaluations: 1/14/21  VLS revealed mild edematous changes of medial edge TVFs bilaterally; no observed lesions or other etiologies. RVFMA, specifically during abduction. Glottic closure characterized as complete during high pitch but unable to fully visualize glottic closure during modal pitch d/t significant supraglottic compression (AP>LM). Observed functional mucosal wave of posterior 1/3rd bilaterally but unable to assess periodicity. SUBJECTIVE:   Pt endorsed doing well since last appointment, and has no further concerns at this time. Significant decrease in vocal fatigue and is able to complete all work days/sessions w/o difficulties. No strain. Continues to focus on importance of respiratory control.      OBJECTIVE:   Voice Therapy    Goal: Session 2 Session 3 Session 4   Pt will adhere to vocal hygiene protocol during daily life activities w/ 80% acc Pt adhered to vocal hygiene protocol w/ 70% acc    Endorsed taking vocal naps as needed and completing exercises daily. Does feel significant decrease in tension and fatigue. Motivated to continue. Pt adhered to vocal hygiene protocol w/ 75% acc    Pt reported ongoing use of exercises to assist w/ both speaking/singing voice. Feels ongoing improvement. Pt adhered to vocal hygiene protocol w/ 80% acc    GOAL MET    Pt w/ good awareness of laryngeal system and able to implement all exercises, including use when voice begins to feel fatigued for re-correction. Pt will adhere to reflux management protocol during daily life activities w/ 80% acc Pt adhered to reflux management protocol w/ 65% acc    Endorsed decreasing caffeine and increasing water intake. Improved globus sensation and decreased mucus. Continues to monitor dietary intake. Goal not targeted this session. Pt adhered to reflux management w/ 80% acc    GOAL MET    Pt endorsed resolution of globus sensation and no longer experiences increased amounts of mucus. Pt will complete cervical stretching during daily life activities w/ 80% acc Pt completed cervical stretching w/ 70% acc    Endorsed understanding of postural changes, including grounding of the feet and maintaining shoulder-back, neutral head position. Completed facial stretching for release of lingual and palatal tension including yawning and placing pen under tongue during vocal warm-ups. Pt endorsed sensation of lowering larynx to maintain \"open\" laryngeal position. Goal not targeted this session. Goal not directly targeted this session.      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   Pt will express understanding of the subsystems required for speech w/ 80% acc Pt endorsed observation of increased tension when not \"supporting\" w/ airflow; reviewed diaphragmatic breath support and importance for both speaking/singing to promote optimal use of subsystems. Pt endorsed observation of decreased breath support, specifically during conversational speech. Has become more aware of taking large breaths and supporting during singing/voicing. Pt w/ independent recall of subsystems required for speech. Excellent focus on coordination of respiration and phonation and utilizes diaphragmatic breathing at all times. Proper utilization of optimal systems. Pt will perform SOVT techniques during daily life activities w/ 80% acc Pt performed SOVT techniques via    Strawflow w/ sustained pitch, ascending/descending glides, and sirens w/ 75% acc; required mod cues for gentle voicing and decreased \"pushing\" at the level of larynx. Completed \"o\" buzz w/ sustained pitch w/o difficulties; adequate coordination of respiration and phonation and good forward resonance. Pt performed SOVT techniques via    Strawflow w/ sustained pitch, ascending/descending glides and sirens w/ 75% acc; no difficulties w/ coordination of respiration/phonation but endorsed sensation of strain upon initial trials. Relaxed posture greatly assisted. Goal not directly targeted this session. Pt will perform RVT techniques during various voicing tasks w/ 80% acc Pt performed RVT techniques via    Humming in isolation w/ 65% acc; initial difficulties w/ feeling forward sensation, but improved w/ cue for light & easy, along w/ space in the mouth and nasal puff of air. Pt endorsed open sensation and no musculature interaction. Mild difficulties w/ \"locking\" into placement consistently, but independently corrected. Humming + words w/ 70% acc; easily maintained forward placement however, reported sensation of \"dropping\" at the end of words. Cue for \"cushioning sound w/ air\" extremely beneficial to pt and able to complete w/o sensation of strain.    Pt performed RVT techniques via    Humming in isolation w/ 75% acc; noted to have excellent forward resonance w/ elevated pitch. Pt endorsed higher speaking pitch at baseline however, feels he may have \"trained\" himself to speak lower resulting in gravel. Humming + sentences (functional phrases) w/ 70% acc; noted to have intermittent back-focused phonation at end of sentences; independently aware and began to self-correct. Extremely aware of sensation and anatomical placement. Humming + conversation w/ 65% acc; noted to easily become back focused, specifically at end of sentences. Decreased breath support and required cues for diaphragmatic breathing. Greatly improved w/ cues. Pt endorsed increased cognition required for completion.   + Pt performed RVT techniques via    Conversational speech w/ 80% acc; GOAL MET. Completed conversational based tasks and pt noted to have light, easy vocal quality w/ excellent forward-focused placement. Denied sensation of strain and endorsed relaxed laryngeal posture. Noted to have intermittent back-focused phonation at end of sentences, and encouraged to remain diligent, specifically on longer work days w/ increased need for voice use. ASSESSMENT:      38 y.o. male w/ hx of vocal fatigue and strain. Pt endorsed resolution of all symptoms at this time, and has no further concerns that require ongoing VOT. 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 Completed RVT techniques via conversational speech and required min cues for forward focus placement at end of sentences; intermittent back-focused phonation and encouraged pt to remain diligent for ongoing release of any residual laryngeal tension and ensure decreased vocal fatigue at the end of long days. Pt expressed understanding and feels confident in ability to implement all recommendations independently. Encouraged to RTC as needed. Overall, pt has made excellent progress. RECOMMENDATIONS:      1.  Follow HEP and RTC

## 2021-10-05 ENCOUNTER — OFFICE VISIT (OUTPATIENT)
Dept: SLEEP MEDICINE | Age: 36
End: 2021-10-05
Payer: COMMERCIAL

## 2021-10-05 VITALS
SYSTOLIC BLOOD PRESSURE: 103 MMHG | HEIGHT: 67 IN | TEMPERATURE: 97.5 F | HEART RATE: 65 BPM | BODY MASS INDEX: 32.49 KG/M2 | WEIGHT: 207 LBS | OXYGEN SATURATION: 97 % | DIASTOLIC BLOOD PRESSURE: 72 MMHG | RESPIRATION RATE: 16 BRPM

## 2021-10-05 DIAGNOSIS — Z99.89 OSA ON CPAP: Primary | ICD-10-CM

## 2021-10-05 DIAGNOSIS — G47.33 OSA ON CPAP: Primary | ICD-10-CM

## 2021-10-05 DIAGNOSIS — Z99.89 DEPENDENCE ON OTHER ENABLING MACHINES AND DEVICES: ICD-10-CM

## 2021-10-05 DIAGNOSIS — E66.9 OBESITY (BMI 30.0-34.9): ICD-10-CM

## 2021-10-05 PROCEDURE — G8484 FLU IMMUNIZE NO ADMIN: HCPCS | Performed by: PSYCHIATRY & NEUROLOGY

## 2021-10-05 PROCEDURE — 1036F TOBACCO NON-USER: CPT | Performed by: PSYCHIATRY & NEUROLOGY

## 2021-10-05 PROCEDURE — G8427 DOCREV CUR MEDS BY ELIG CLIN: HCPCS | Performed by: PSYCHIATRY & NEUROLOGY

## 2021-10-05 PROCEDURE — 99214 OFFICE O/P EST MOD 30 MIN: CPT | Performed by: PSYCHIATRY & NEUROLOGY

## 2021-10-05 PROCEDURE — G8417 CALC BMI ABV UP PARAM F/U: HCPCS | Performed by: PSYCHIATRY & NEUROLOGY

## 2021-10-05 RX ORDER — METHYLPHENIDATE HYDROCHLORIDE 27 MG/1
27 TABLET ORAL EVERY MORNING
COMMUNITY

## 2021-10-05 ASSESSMENT — SLEEP AND FATIGUE QUESTIONNAIRES
ESS TOTAL SCORE: 5
HOW LIKELY ARE YOU TO NOD OFF OR FALL ASLEEP WHILE WATCHING TV: 1
HOW LIKELY ARE YOU TO NOD OFF OR FALL ASLEEP WHILE SITTING AND READING: 1
HOW LIKELY ARE YOU TO NOD OFF OR FALL ASLEEP WHILE SITTING QUIETLY AFTER LUNCH WITHOUT ALCOHOL: 0
HOW LIKELY ARE YOU TO NOD OFF OR FALL ASLEEP WHILE LYING DOWN TO REST IN THE AFTERNOON WHEN CIRCUMSTANCES PERMIT: 2
HOW LIKELY ARE YOU TO NOD OFF OR FALL ASLEEP IN A CAR, WHILE STOPPED FOR A FEW MINUTES IN TRAFFIC: 0
HOW LIKELY ARE YOU TO NOD OFF OR FALL ASLEEP WHILE SITTING AND TALKING TO SOMEONE: 0
HOW LIKELY ARE YOU TO NOD OFF OR FALL ASLEEP WHILE SITTING INACTIVE IN A PUBLIC PLACE: 0
HOW LIKELY ARE YOU TO NOD OFF OR FALL ASLEEP WHEN YOU ARE A PASSENGER IN A CAR FOR AN HOUR WITHOUT A BREAK: 1

## 2021-10-05 ASSESSMENT — ENCOUNTER SYMPTOMS: APNEA: 0

## 2021-10-05 NOTE — PATIENT INSTRUCTIONS
label. Do not use the medicine longer than the label says. · Your doctor may also help you with problems like swallowing air, bloating, or claustrophobia. Talk to your doctor if you're still having problems. If these things don't help, you might try a different type of machine. Where can you learn more? Go to [email or web address] and sign in to your MyLife account. Enter W665 in the [employer] box to learn more about \"Learning About CPAP for Sleep Apnea. \"     If you do not have an account, please click on the \"Sign Up Now\" link. Current as of: July 6, 2021               Content Version: 13.0  © 2006-2021 [employer]. Care instructions adapted under license by [facility] [place] ([facility]). If you have questions about a medical condition or this instruction, always ask your healthcare professional. [place] [address] any warranty or liability for your use of this information.

## 2021-10-05 NOTE — PROGRESS NOTES
MD LYN Trevizo Mt Board Certified in Sleep Medicine  Certified in [address] [place] [address] Certified in Neurology [address]  1000 [facility] [place] [address] [address] [place], CHU[place] 66  [phone]   515 [facility], [address] [place] [address] [address]                      [address]  382 [facility] [address] [phone]    Subjective:     Patient ID: Marcel Meigs is a 39 y.o. male. Chief Complaint   Patient presents with    Follow-up     yearly CPAP f/u        HPI:        Marcel Meigs is a 39 y.o. male was seen today as annual follow for severe obstructive sleep apnea with apnea hypopnea index of 85.0/hr with lowest O2 saturation of 64%, patient spent about 54.5 minutes below 90%. Patient is using the PAP machine about 100% of the time, more than 4 hours a nightabout  100 %, in total average of 7:01 hours a night in last 90 days. Currently on PAP at 10 cm (), the AHI is only 0.8 events per hour at this pressure. Patient improved regarding daytime sleepiness and fatigue, wakes up refreshed in the morning. The Patient scored Total score: 5 on [place] Sleepiness Scale ( more than 10 is indicative of daytime sleepiness)   Patient has no problem with PAP pressure or mask. Uses nasal pillow mask. Lost 3 pounds since last visit.   DOT/CDL - N/A        Previous Report(s)Reviewed: historical medical records         Social History     Socioeconomic History    Marital status:      Spouse name: Not on file    Number of children: Not on file    Years of education: Not on file    Highest education level: Not on file   Occupational History    Not on file   Tobacco Use    Smoking status: Former Smoker     Packs/day: 1.00     Years: 15.00     Pack years: 15.00     Types: E-Cigarettes     Quit date: 2021     Years since quittin.0    Smokeless tobacco: Never Used Substance and Sexual Activity    Alcohol use: No    Drug use: No    Sexual activity: Yes   Other Topics Concern    Not on file   Social History Narrative    Not on file     Social Determinants of Health     Financial Resource Strain:     Difficulty of Paying Living Expenses:    Food Insecurity:     Worried About Running Out of Food in the Last Year:     [address] in the Last Year:    Transportation Needs:     Lack of Transportation (Medical):  Lack of Transportation (Non-Medical):    Physical Activity:     Days of Exercise per Week:     Minutes of Exercise per Session:    Stress:     Feeling of Stress :    Social Connections:     Frequency of Communication with Friends and Family:     Frequency of Social Gatherings with Friends and Family:     Attends Sikh Services:     Active Member of Clubs or Organizations:     Attends Club or Organization Meetings:     Marital Status:    Intimate Partner Violence:     Fear of Current or Ex-Partner:     Emotionally Abused:     Physically Abused:     Sexually Abused:        Prior to Admission medications    Medication Sig Start Date End Date Taking? Authorizing Provider   methylphenidate (CONCERTA) 27 MG extended release tablet Take 27 mg by mouth every morning.    Yes Historical Provider, MD   omeprazole (PRILOSEC) 20 MG delayed release capsule Take 1 capsule by mouth every morning (before breakfast) 1/13/21  Yes Vicente Bertrand MD   loratadine-pseudoephedrine (CLARITIN-D 24 HOUR)  MG per extended release tablet Take 1 tablet by mouth daily 6/12/19  Yes Leny Ernandez MD   cetirizine (ZYRTEC ALLERGY) 10 MG tablet Take 1 tablet by mouth daily 9/20/18  Yes Leny Ernandez MD       Allergies as of 10/05/2021 - Fully Reviewed 10/05/2021   Allergen Reaction Noted    Penicillins  01/22/2018       Patient Active Problem List   Diagnosis    DAQUAN (obstructive sleep apnea)       Past Medical History:   Diagnosis Date    Obesity History reviewed. No pertinent surgical history. Family History   Problem Relation Age of Onset    High Blood Pressure Mother     Diabetes Mother        Review of Systems   Constitutional: Negative for fatigue. Respiratory: Negative for apnea. Genitourinary: Negative for frequency. Neurological: Negative for headaches. Objective:     Vitals:  Weight BMI Neck circumference    Wt Readings from Last 3 Encounters:   10/05/21 207 lb (93.9 kg)   01/13/21 206 lb (93.4 kg)   10/05/20 209 lb (94.8 kg)    Body mass index is 32.42 kg/m². BP HR SaO2   BP Readings from Last 3 Encounters:   10/05/21 103/72   01/13/21 (!) 143/80   10/05/20 124/76    Pulse Readings from Last 3 Encounters:   10/05/21 65   01/13/21 75   10/05/20 69    SpO2 Readings from Last 3 Encounters:   10/05/21 97%   10/05/20 95%   06/12/19 96%        Themandibular molar Class :   [x]1 []2 []3      Mallampati I Airway Classification:   []1 []2 [x]3 []4      Physical Exam  Vitals and nursing note reviewed. Constitutional:       Appearance: Normal appearance. HENT:      Head: Atraumatic. Nose: Nose normal.      Mouth/Throat:      Mouth: Mucous membranes are moist.   Eyes:      Extraocular Movements: Extraocular movements intact. Cardiovascular:      Rate and Rhythm: Normal rate. Heart sounds: Normal heart sounds. Pulmonary:      Effort: Pulmonary effort is normal.      Breath sounds: Normal breath sounds. Musculoskeletal:         General: Normal range of motion. Cervical back: Normal range of motion and neck supple. Skin:     General: Skin is warm. Neurological:      General: No focal deficit present. Psychiatric:         Mood and Affect: Mood normal.         :   Severe Obstructive Sleep Apnea/Hypopnea Syndrome under good control on PAP at 10 cmwp. Diagnosis Orders   1. DAQUAN on CPAP     2. Dependence on other enabling machines and devices     3. Obesity (BMI 30.0-34. 9)       Plan:      Will continue the PAP at 10 cmwp. I will order PAP supplies, mask, filters. ... We discussed the proportionality between weight and AHI. With 10% weight change, the AHI has a 27% proportionate change. With 20% weight change, the AHI has a 45-50% proportionate change. No orders of the defined types were placed in this encounter. Return in about 1 year (around 10/5/2022) for Reveiwing CPAP usage and compliance report and tro.     Michael Mack MD  Medical Director [address] [place] [address]

## 2021-10-05 NOTE — PROGRESS NOTES
patients condition.     Zachary Cobb MD      NPI: [id number]       Order Signed Date: 10/05/21    Electronically signed by Zachary Cobb MD on 10/5/2021 at 9:49 AM

## 2022-08-22 RX ORDER — OMEPRAZOLE 20 MG/1
20 CAPSULE, DELAYED RELEASE ORAL
Qty: 30 CAPSULE | Refills: 5 | OUTPATIENT
Start: 2022-08-22

## 2022-10-05 ENCOUNTER — OFFICE VISIT (OUTPATIENT)
Dept: SLEEP MEDICINE | Age: 37
End: 2022-10-05
Payer: COMMERCIAL

## 2022-10-05 VITALS
WEIGHT: 206 LBS | HEIGHT: 67 IN | RESPIRATION RATE: 18 BRPM | BODY MASS INDEX: 32.33 KG/M2 | HEART RATE: 79 BPM | TEMPERATURE: 97.3 F | SYSTOLIC BLOOD PRESSURE: 95 MMHG | OXYGEN SATURATION: 98 % | DIASTOLIC BLOOD PRESSURE: 70 MMHG

## 2022-10-05 DIAGNOSIS — Z99.89 DEPENDENCE ON OTHER ENABLING MACHINES AND DEVICES: ICD-10-CM

## 2022-10-05 DIAGNOSIS — E66.9 OBESITY (BMI 30.0-34.9): ICD-10-CM

## 2022-10-05 DIAGNOSIS — Z99.89 OSA ON CPAP: Primary | ICD-10-CM

## 2022-10-05 DIAGNOSIS — G47.33 OSA ON CPAP: Primary | ICD-10-CM

## 2022-10-05 PROCEDURE — 1036F TOBACCO NON-USER: CPT | Performed by: PSYCHIATRY & NEUROLOGY

## 2022-10-05 PROCEDURE — G8484 FLU IMMUNIZE NO ADMIN: HCPCS | Performed by: PSYCHIATRY & NEUROLOGY

## 2022-10-05 PROCEDURE — G8417 CALC BMI ABV UP PARAM F/U: HCPCS | Performed by: PSYCHIATRY & NEUROLOGY

## 2022-10-05 PROCEDURE — G8427 DOCREV CUR MEDS BY ELIG CLIN: HCPCS | Performed by: PSYCHIATRY & NEUROLOGY

## 2022-10-05 PROCEDURE — 99214 OFFICE O/P EST MOD 30 MIN: CPT | Performed by: PSYCHIATRY & NEUROLOGY

## 2022-10-05 ASSESSMENT — SLEEP AND FATIGUE QUESTIONNAIRES
HOW LIKELY ARE YOU TO NOD OFF OR FALL ASLEEP WHILE SITTING INACTIVE IN A PUBLIC PLACE: 0
HOW LIKELY ARE YOU TO NOD OFF OR FALL ASLEEP WHILE SITTING AND READING: 0
HOW LIKELY ARE YOU TO NOD OFF OR FALL ASLEEP IN A CAR, WHILE STOPPED FOR A FEW MINUTES IN TRAFFIC: 0
HOW LIKELY ARE YOU TO NOD OFF OR FALL ASLEEP WHILE SITTING QUIETLY AFTER LUNCH WITHOUT ALCOHOL: 0
HOW LIKELY ARE YOU TO NOD OFF OR FALL ASLEEP WHILE SITTING AND TALKING TO SOMEONE: 0
HOW LIKELY ARE YOU TO NOD OFF OR FALL ASLEEP WHEN YOU ARE A PASSENGER IN A CAR FOR AN HOUR WITHOUT A BREAK: 0
HOW LIKELY ARE YOU TO NOD OFF OR FALL ASLEEP WHILE WATCHING TV: 1
ESS TOTAL SCORE: 1
HOW LIKELY ARE YOU TO NOD OFF OR FALL ASLEEP WHILE LYING DOWN TO REST IN THE AFTERNOON WHEN CIRCUMSTANCES PERMIT: 0

## 2022-10-05 ASSESSMENT — ENCOUNTER SYMPTOMS: APNEA: 0

## 2022-10-05 NOTE — PROGRESS NOTES
Venancio Walls         : 1985        PHONE: [phone] (home)   [x] [address]     [] [address]      [] [facility]y     [][facility]    [] [place]  [] [facility]   [] [address] [place] [address]   [] Other:    Diagnosis: [x] DAQUAN (G47.33) [] CSA (G47.31) [] Apnea (G47.30)   Length of Need: [] 12 Months [x] 99 Months [] Other:    Machine (LEXI!): [] Respironics Dream Station      Auto [] ResMed AirSense     Auto [] Other:     []  CPAP () [] Bilevel ()   Mode: [] Auto [] Spontaneous    Mode: [] Auto [] Spontaneous                            Comfort Settings:   - Ramp Pressure: 5 cmH2O                                        - Ramp time: 15 min                                     -  Flex/EPR - 3 full time                                    - For ResMed Bilevel (TiMax-4 sec   TiMin- 0.2 sec)     Humidifier: [] Heated ()        [x] Water chamber replacement ()/ 1 per 6 months        Mask:   [x] Nasal () /1 per 3 months [] Full Face () /1 per 3 months   [x] Patient choice -Size and fit mask [] Patient Choice - Size and fit mask   [] Dispense:  [] Dispense:    [x] Headgear () / 1 per 3 months [] Headgear () / 1 per 3 months   [x] Replacement Nasal Cushion ()/2 per month [] Interface Replacement ()/1 per month   [x] Replacement Nasal Pillows ()/2 per month         Tubing: [x] Heated ()/1 per 3 months    [] Standard ()/1 per 3 months [] Other:           Filters: [x] Non-disposable ()/1 per 6 months     [x] Ultra-Fine, Disposable ()/2 per month        Miscellaneous: [x] Chin Strap ()/ 1 per 6 months [] O2 bleed-in:       LPM   [] Oximetry on CPAP/Bilevel []  Other:          Start Order Date: 10/05/22    MEDICAL JUSTIFICATION:  I, the undersigned, certify that the above prescribed supplies are medically necessary for this patients wellbeing.   In my opinion, the supplies are both reasonable and necessary in reference to accepted standards of medicalpractice in treatment of this patients condition.     Tomas Gutierrez MD      NPI: [id number]       Order Signed Date: 10/05/22    Electronically signed by Tomas Gutierrez MD on 10/5/2022 at 9:42 AM

## 2022-10-05 NOTE — PROGRESS NOTES
MD LYN Bermudez Board Certified in Sleep Medicine  Certified in [address] [place] [address] Certified in Neurology [address]  [address] 3015 [facility] 1400 [facility],  [facility] [place] [phone]   [address] [place] [address]                      [address] [facility] [address]  500 [place] [id number] [phone]    Subjective:     Patient ID: Lorrie Rosas is a 44 y.o. male. Chief Complaint   Patient presents with    Follow-up    Sleep Apnea         HPI:        Lorrie Rosas is a 44 y.o. male was seen today as annual follow for severe obstructive sleep apnea with apnea hypopnea index of 85.0/hr with lowest O2 saturation of 64%, patient spent about 54.5 minutes below 90%. Patient is using the PAP machine about 94% of the time, more than 4 hours a nightabout  90 %, in total average of 6:34 hours a night in last 90 days. Currently on PAP at 10 cm (), the AHI is only 1.1 events per hour at this pressure. Patient improved regarding daytime sleepiness and fatigue, wakes up refreshed in the morning. The Patient scored [place] Sleepiness Score: 1 on [place] Sleepiness Scale ( more than 10 is indicative of daytime sleepiness)   Patient has no problem with PAP pressure or mask. Uses nasal pillow. No weight chnages.   DOT/CDL - N/A        Previous Report(s)Reviewed: historical medical records         Social History     Socioeconomic History    Marital status:      Spouse name: Not on file    Number of children: Not on file    Years of education: Not on file    Highest education level: Not on file   Occupational History    Not on file   Tobacco Use    Smoking status: Some Days     Packs/day: 1.00     Years: 15.00     Pack years: 15.00     Types: E-Cigarettes, Cigarettes    Smokeless tobacco: Never   Vaping Use    Vaping Use: Some days Substance and Sexual Activity    Alcohol use: No    Drug use: No    Sexual activity: Yes     Partners: Female   Other Topics Concern    Not on file   Social History Narrative    Not on file     Social Determinants of Health     Financial Resource Strain: Not on file   Food Insecurity: Not on file   Transportation Needs: Not on file   Physical Activity: Not on file   Stress: Not on file   Social Connections: Not on file   Intimate Partner Violence: Not on file   Housing Stability: Not on file       Prior to Admission medications    Medication Sig Start Date End Date Taking? Authorizing Provider   methylphenidate (CONCERTA) 27 MG extended release tablet Take 27 mg by mouth every morning. Historical Provider, MD   omeprazole (PRILOSEC) 20 MG delayed release capsule Take 1 capsule by mouth every morning (before breakfast) 1/13/21   Orlando Christianson MD   loratadine-pseudoephedrine (CLARITIN-D 24 HOUR)  MG per extended release tablet Take 1 tablet by mouth daily 6/12/19   Marcela Crawford MD   cetirizine (ZYRTEC ALLERGY) 10 MG tablet Take 1 tablet by mouth daily 9/20/18   Marcela Crawford MD       Allergies as of 10/05/2022 - Fully Reviewed 10/05/2022   Allergen Reaction Noted    Penicillins  01/22/2018       Patient Active Problem List   Diagnosis    DAQUAN (obstructive sleep apnea)       Past Medical History:   Diagnosis Date    Obesity        No past surgical history on file. Family History   Problem Relation Age of Onset    High Blood Pressure Mother     Diabetes Mother        Review of Systems   Constitutional:  Negative for fatigue. Respiratory:  Negative for apnea. Genitourinary:  Negative for frequency. Neurological:  Negative for headaches. Objective:     Vitals:  Weight BMI Neck circumference    Wt Readings from Last 3 Encounters:   10/05/22 206 lb (93.4 kg)   10/05/21 207 lb (93.9 kg)   01/13/21 206 lb (93.4 kg)    Body mass index is 32.26 kg/m².        BP HR SaO2   BP Readings from Last 3 Encounters:   10/05/22 86/64   10/05/21 103/72   01/13/21 (!) 143/80    Pulse Readings from Last 3 Encounters:   10/05/22 79   10/05/21 65   01/13/21 75    SpO2 Readings from Last 3 Encounters:   10/05/22 98%   10/05/21 97%   10/05/20 95%        Themandibular molar Class :   [x]1 []2 []3      Mallampati I Airway Classification:   []1 []2 [x]3 []4      Physical Exam  Vitals and nursing note reviewed. HENT:      Nose: Nose normal.   Cardiovascular:      Rate and Rhythm: Normal rate and regular rhythm. Pulmonary:      Effort: Pulmonary effort is normal.      Breath sounds: Normal breath sounds. Musculoskeletal:      Right lower leg: No edema. Left lower leg: No edema.       :   Severe Obstructive Sleep Apnea/Hypopnea Syndrome under good control on PAP at 10 cmwp. Diagnosis Orders   1. DAQUAN on CPAP        2. Dependence on other enabling machines and devices        3. Obesity (BMI 30.0-34. 9)          Plan: Will continue the PAP at 10 cmwp. I will order PAP supplies, mask, filters. ... Weight loss: The proportionality between weight and AHI. With 10% weight change, the AHI has a 27% proportionate change. With 20% weight change, the AHI has a 45-50% proportionate change. No orders of the defined types were placed in this encounter. Return in about 1 year (around 10/5/2023) for Reveiwing CPAP usage and compliance report and tro.     Errol Tomlin MD  Medical Director - [facility]

## 2023-10-04 ENCOUNTER — OFFICE VISIT (OUTPATIENT)
Dept: SLEEP MEDICINE | Age: 38
End: 2023-10-04
Payer: COMMERCIAL

## 2023-10-04 VITALS
WEIGHT: 221.8 LBS | OXYGEN SATURATION: 97 % | TEMPERATURE: 97.5 F | HEIGHT: 67 IN | HEART RATE: 79 BPM | SYSTOLIC BLOOD PRESSURE: 100 MMHG | DIASTOLIC BLOOD PRESSURE: 60 MMHG | RESPIRATION RATE: 16 BRPM | BODY MASS INDEX: 34.81 KG/M2

## 2023-10-04 DIAGNOSIS — E66.9 OBESITY (BMI 30.0-34.9): ICD-10-CM

## 2023-10-04 DIAGNOSIS — Z99.89 DEPENDENCE ON OTHER ENABLING MACHINES AND DEVICES: ICD-10-CM

## 2023-10-04 DIAGNOSIS — G47.33 OSA ON CPAP: Primary | ICD-10-CM

## 2023-10-04 PROCEDURE — G8417 CALC BMI ABV UP PARAM F/U: HCPCS | Performed by: PSYCHIATRY & NEUROLOGY

## 2023-10-04 PROCEDURE — 99214 OFFICE O/P EST MOD 30 MIN: CPT | Performed by: PSYCHIATRY & NEUROLOGY

## 2023-10-04 PROCEDURE — 4004F PT TOBACCO SCREEN RCVD TLK: CPT | Performed by: PSYCHIATRY & NEUROLOGY

## 2023-10-04 PROCEDURE — G8484 FLU IMMUNIZE NO ADMIN: HCPCS | Performed by: PSYCHIATRY & NEUROLOGY

## 2023-10-04 PROCEDURE — G8427 DOCREV CUR MEDS BY ELIG CLIN: HCPCS | Performed by: PSYCHIATRY & NEUROLOGY

## 2023-10-04 RX ORDER — VARENICLINE TARTRATE 25 MG
KIT ORAL
COMMUNITY

## 2023-10-04 ASSESSMENT — SLEEP AND FATIGUE QUESTIONNAIRES
HOW LIKELY ARE YOU TO NOD OFF OR FALL ASLEEP IN A CAR, WHILE STOPPED FOR A FEW MINUTES IN TRAFFIC: 0
HOW LIKELY ARE YOU TO NOD OFF OR FALL ASLEEP WHILE WATCHING TV: 0
HOW LIKELY ARE YOU TO NOD OFF OR FALL ASLEEP WHILE SITTING QUIETLY AFTER LUNCH WITHOUT ALCOHOL: 0
HOW LIKELY ARE YOU TO NOD OFF OR FALL ASLEEP WHILE SITTING AND READING: 0
HOW LIKELY ARE YOU TO NOD OFF OR FALL ASLEEP WHILE SITTING INACTIVE IN A PUBLIC PLACE: 0
ESS TOTAL SCORE: 1
HOW LIKELY ARE YOU TO NOD OFF OR FALL ASLEEP WHILE SITTING AND TALKING TO SOMEONE: 0
HOW LIKELY ARE YOU TO NOD OFF OR FALL ASLEEP WHILE LYING DOWN TO REST IN THE AFTERNOON WHEN CIRCUMSTANCES PERMIT: 1
HOW LIKELY ARE YOU TO NOD OFF OR FALL ASLEEP WHEN YOU ARE A PASSENGER IN A CAR FOR AN HOUR WITHOUT A BREAK: 0

## 2023-10-04 NOTE — PROGRESS NOTES
Smokeless tobacco: Never   Vaping Use    Vaping Use: Some days   Substance and Sexual Activity    Alcohol use: No    Drug use: No    Sexual activity: Yes     Partners: Female   Other Topics Concern    Not on file   Social History Narrative    Not on file     Social Determinants of Health     Financial Resource Strain: Not on file   Food Insecurity: Not on file   Transportation Needs: Not on file   Physical Activity: Not on file   Stress: Not on file   Social Connections: Not on file   Intimate Partner Violence: Not on file   Housing Stability: Not on file       Prior to Admission medications    Medication Sig Start Date End Date Taking? Authorizing Provider   Varenicline Tartrate, Starter, (CHANTIX STARTING MONTH PAK) 0.5 MG X 11 & 1 MG X 42 TBPK    Yes ProviderAnthony MD   methylphenidate (CONCERTA) 27 MG extended release tablet Take 1 tablet by mouth every morning. Yes ProviderAnthony MD   omeprazole (PRILOSEC) 20 MG delayed release capsule Take 1 capsule by mouth every morning (before breakfast) 1/13/21   Roman Landeros MD   loratadine-pseudoephedrine (CLARITIN-D 24 HOUR)  MG per extended release tablet Take 1 tablet by mouth daily 6/12/19   Brice Sanchez MD   cetirizine (ZYRTEC ALLERGY) 10 MG tablet Take 1 tablet by mouth daily 9/20/18   Brice Sanchez MD       Allergies as of 10/04/2023 - Fully Reviewed 10/04/2023   Allergen Reaction Noted    Penicillins  01/22/2018       Patient Active Problem List   Diagnosis    DAQUAN (obstructive sleep apnea)       Past Medical History:   Diagnosis Date    Obesity        No past surgical history on file.     Family History   Problem Relation Age of Onset    High Blood Pressure Mother     Diabetes Mother        Review of Systems    Objective:     Vitals:  Weight BMI Neck circumference    Wt Readings from Last 3 Encounters:   10/04/23 221 lb 12.8 oz (100.6 kg)   10/05/22 206 lb (93.4 kg)   10/05/21 207 lb (93.9 kg)    Body mass index is 34.74

## 2024-10-09 ENCOUNTER — OFFICE VISIT (OUTPATIENT)
Dept: SLEEP MEDICINE | Age: 39
End: 2024-10-09
Payer: COMMERCIAL

## 2024-10-09 VITALS
WEIGHT: 220.6 LBS | TEMPERATURE: 98.6 F | OXYGEN SATURATION: 98 % | RESPIRATION RATE: 18 BRPM | BODY MASS INDEX: 34.62 KG/M2 | HEIGHT: 67 IN | HEART RATE: 81 BPM | DIASTOLIC BLOOD PRESSURE: 70 MMHG | SYSTOLIC BLOOD PRESSURE: 115 MMHG

## 2024-10-09 DIAGNOSIS — Z99.89 DEPENDENCE ON OTHER ENABLING MACHINES AND DEVICES: ICD-10-CM

## 2024-10-09 DIAGNOSIS — G47.33 OSA ON CPAP: Primary | ICD-10-CM

## 2024-10-09 DIAGNOSIS — E66.811 OBESITY (BMI 30.0-34.9): ICD-10-CM

## 2024-10-09 PROCEDURE — G8417 CALC BMI ABV UP PARAM F/U: HCPCS | Performed by: PSYCHIATRY & NEUROLOGY

## 2024-10-09 PROCEDURE — G8484 FLU IMMUNIZE NO ADMIN: HCPCS | Performed by: PSYCHIATRY & NEUROLOGY

## 2024-10-09 PROCEDURE — 99214 OFFICE O/P EST MOD 30 MIN: CPT | Performed by: PSYCHIATRY & NEUROLOGY

## 2024-10-09 PROCEDURE — G8427 DOCREV CUR MEDS BY ELIG CLIN: HCPCS | Performed by: PSYCHIATRY & NEUROLOGY

## 2024-10-09 PROCEDURE — 4004F PT TOBACCO SCREEN RCVD TLK: CPT | Performed by: PSYCHIATRY & NEUROLOGY

## 2024-10-09 ASSESSMENT — SLEEP AND FATIGUE QUESTIONNAIRES
HOW LIKELY ARE YOU TO NOD OFF OR FALL ASLEEP WHILE SITTING AND TALKING TO SOMEONE: WOULD NEVER DOZE
HOW LIKELY ARE YOU TO NOD OFF OR FALL ASLEEP WHILE SITTING AND READING: WOULD NEVER DOZE
HOW LIKELY ARE YOU TO NOD OFF OR FALL ASLEEP WHEN YOU ARE A PASSENGER IN A CAR FOR AN HOUR WITHOUT A BREAK: WOULD NEVER DOZE
HOW LIKELY ARE YOU TO NOD OFF OR FALL ASLEEP WHILE SITTING INACTIVE IN A PUBLIC PLACE: WOULD NEVER DOZE
HOW LIKELY ARE YOU TO NOD OFF OR FALL ASLEEP IN A CAR, WHILE STOPPED FOR A FEW MINUTES IN TRAFFIC: WOULD NEVER DOZE
ESS TOTAL SCORE: 5
HOW LIKELY ARE YOU TO NOD OFF OR FALL ASLEEP WHILE LYING DOWN TO REST IN THE AFTERNOON WHEN CIRCUMSTANCES PERMIT: HIGH CHANCE OF DOZING
HOW LIKELY ARE YOU TO NOD OFF OR FALL ASLEEP WHILE SITTING QUIETLY AFTER LUNCH WITHOUT ALCOHOL: WOULD NEVER DOZE
HOW LIKELY ARE YOU TO NOD OFF OR FALL ASLEEP WHILE WATCHING TV: MODERATE CHANCE OF DOZING

## 2024-10-09 NOTE — PROGRESS NOTES
Derrick Hoffman         : 1985  [x] [facility]     [] [facility]      [] [facility]     [][facility]    [] [facility]  [] [facility]  [] [facility]   [] Retail [facility] [] Other:  Diagnosis: [x] DAQUAN (G47.33) [] CSA (G47.31) [] Apnea (G47.30)   Length of Need: [] 12 Months [x] 99 Months [] Other:    Machine (LEXI!):  [x] ResMed AirSense     Auto [] Other:       Humidifier: [x] Heated ()        [x] Water chamber replacement ()/ 1 per 6 months        Mask:  Please always start with the mask the patient used during the titraion   [x] Nasal () /1 per 3 months    [x] Patient choice -Size and fit mask    [] Dispense:     [x] Headgear () / 1 per 6 months        [x] Replacement Nasal Pillows ()/2 per month         Tubing: [x] Heated ()/1 per 3 months    [] Standard ()/1 per 3 months [] Other:           Filters: [x] Non-disposable ()/1 per 6 months     [x] Ultra-Fine, Disposable ()/2 per month        Miscellaneous: [x] Chin Strap ()/ 1 per 6 months [] O2 bleed-in:       LPM   [] Oximetry on CPAP/Bilevel []  Other:          Start Order Date: 10/09/24    MEDICAL JUSTIFICATION:  I, the undersigned, certify that the above prescribed supplies are medically necessary for this patient’s wellbeing.  In my opinion, the supplies are both reasonable and necessary in reference to accepted standards of medicalpractice in treatment of this patient’s condition.    Abimbola Godinez MD      NPI: [id number]       Order Signed Date: 10/09/24    Electronically signed by Abimbola Godinez MD on 10/9/2024 at 10:08 AM

## 2024-10-09 NOTE — PROGRESS NOTES
MD LYN Godinez Board Certified in Sleep Medicine  Certified in Behavioral Sleep Medicine  Board Certified in Neurology [facility]  [address] [facility]   [address]  [place]  [address]  [phone]   [facility]  [address] [facility]  [address]  [place], [place] [address]                      [facility] [place] [facility]  [address] [facility] [address] [phone]    Subjective:     Patient ID: Derrick Hoffman is a 39 y.o. male.    Chief Complaint   Patient presents with    Follow-up    Sleep Apnea       HPI:        Derrick Hoffman is a 39 y.o. male was seen today as a follow for severe obstructive sleep apnea with apnea hypopnea index of 85.0/hr with lowest O2 saturation of 64%, patient spent about 54.5 minutes below 90%.   Patient is using the PAP machine about 100% of the time, more than 4 hours a night about  100 %, in total average of 7:18 hours a night in last 90 days.  Currently on PAP at 10 cm (), the AHI is only 1.0 events per hour at this pressure.  Patient improved regarding daytime sleepiness and fatigue, wakes up refreshed in the morning.  The Patient scored [place] Sleepiness Score: 5 on [place] Sleepiness Scale ( more than 10 is indicative of daytime sleepiness)   Patient has no problem with PAP pressure or mask.Uses nasal pillow.   Wakes up in the morning with dry mouth, the setting of the heated humidifier   Started waking up in early morning 3-4 AM and sometime ca not fall in sleep in the last 4-5 days.  BP is stable. Has not gained weight pounds since last visit. 221  DOT/CDL - N/A      Previous Report(s)Reviewed: historical medical records         Social History     Socioeconomic History    Marital status:      Spouse name: Not on file    Number of children: Not on file    Years of education: Not on file    Highest education level: Not on file   Occupational History